# Patient Record
Sex: FEMALE | Race: WHITE
[De-identification: names, ages, dates, MRNs, and addresses within clinical notes are randomized per-mention and may not be internally consistent; named-entity substitution may affect disease eponyms.]

---
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
## 2020-10-23 ENCOUNTER — HOSPITAL ENCOUNTER (OUTPATIENT)
Dept: HOSPITAL 47 - LABWHC1 | Age: 20
Discharge: HOME | End: 2020-10-23
Attending: OBSTETRICS & GYNECOLOGY
Payer: COMMERCIAL

## 2020-10-23 DIAGNOSIS — Z01.818: Primary | ICD-10-CM

## 2020-10-23 LAB
ANION GAP SERPL CALC-SCNC: 5 MMOL/L
BASOPHILS # BLD AUTO: 0 K/UL (ref 0–0.2)
BASOPHILS NFR BLD AUTO: 1 %
BUN SERPL-SCNC: 13 MG/DL (ref 7–17)
CALCIUM SPEC-MCNC: 9.9 MG/DL (ref 8.4–10.2)
CHLORIDE SERPL-SCNC: 104 MMOL/L (ref 98–107)
CO2 SERPL-SCNC: 30 MMOL/L (ref 22–30)
EOSINOPHIL # BLD AUTO: 0 K/UL (ref 0–0.7)
EOSINOPHIL NFR BLD AUTO: 1 %
ERYTHROCYTE [DISTWIDTH] IN BLOOD BY AUTOMATED COUNT: 4.8 M/UL (ref 3.8–5.4)
ERYTHROCYTE [DISTWIDTH] IN BLOOD: 13.1 % (ref 11.5–15.5)
GLUCOSE SERPL-MCNC: 92 MG/DL (ref 74–99)
HCT VFR BLD AUTO: 43.1 % (ref 34–46)
HGB BLD-MCNC: 13.9 GM/DL (ref 11.4–16)
LYMPHOCYTES # SPEC AUTO: 1.5 K/UL (ref 1–4.8)
LYMPHOCYTES NFR SPEC AUTO: 33 %
MCH RBC QN AUTO: 29 PG (ref 25–35)
MCHC RBC AUTO-ENTMCNC: 32.3 G/DL (ref 31–37)
MCV RBC AUTO: 89.8 FL (ref 80–100)
MONOCYTES # BLD AUTO: 0.3 K/UL (ref 0–1)
MONOCYTES NFR BLD AUTO: 6 %
NEUTROPHILS # BLD AUTO: 2.5 K/UL (ref 1.3–7.7)
NEUTROPHILS NFR BLD AUTO: 57 %
PLATELET # BLD AUTO: 227 K/UL (ref 150–450)
POTASSIUM SERPL-SCNC: 4.6 MMOL/L (ref 3.5–5.1)
SODIUM SERPL-SCNC: 139 MMOL/L (ref 137–145)
WBC # BLD AUTO: 4.4 K/UL (ref 3.8–10.6)

## 2020-10-23 PROCEDURE — 36415 COLL VENOUS BLD VENIPUNCTURE: CPT

## 2020-10-23 PROCEDURE — 85025 COMPLETE CBC W/AUTO DIFF WBC: CPT

## 2020-10-23 PROCEDURE — 80048 BASIC METABOLIC PNL TOTAL CA: CPT

## 2020-11-01 NOTE — P.HPOB
History of Present Illness


H&P Date: 20


Chief Complaint: Complex left ovarian cyst





This is a 20 y.o. female,  0, who presents for exploratory laparotomy, 

left ovarian cystectomy, possible left oophorectomy.  She was diagnosed with a 

large left ovarian cyst in March of this year.  She stopped having menses for 

about 3 months in 2019.  She then resumed menses in about February and 

they were occuring every 2-6 weeks.  Currently menses are more regular.  Her 

latest ultrasound showed left ovary with 2 cysts, 5.9 x 4.6 and 7.6 x 5.1 cm 

with an echogenic structure within measuring 1.6 cm.  Her right ovary appeared 

normal.  Uterus and lining were normal.  She has some cramping on the left side 

during her cycle.  CA-125 was 32.3 (normal).





OB Hx:  G0


Gyn Hx:  No history of STDs.  Using condoms for birth control.


Social Hx:  Single.  Full-time collage student.











Review of Systems


Constitutional: Denies chills, Denies fever


Eyes: denies blurred vision, denies pain


Ears, nose, mouth and throat: Denies headache, Denies sore throat


Cardiovascular: Denies chest pain, Denies shortness of breath


Respiratory: Denies cough


Gastrointestinal: Denies abdominal pain, Denies diarrhea, Denies nausea, Denies 

vomiting


Genitourinary: Reports dysmenorrhea (mild)


Menstruation: Reports menses 1-7 days, Reports menses variable


Musculoskeletal: Denies myalgias


Integumentary: Denies pruritus, Denies rash


Neurological: Denies numbness, Denies weakness


Psychiatric: Reports anxiety, Denies depression


Endocrine: Denies fatigue, Denies weight change





Past Medical History


Additional Past Medical History / Comment(s): left ovarian cyst,"white coat 

syndrome-b/p runs normal at home"


History of Any Multi-Drug Resistant Organisms: None Reported


Additional Past Surgical History / Comment(s): wisdom teeth


Past Anesthesia/Blood Transfusion Reactions: No Reported Reaction


Additional Past Anesthesia/Blood Transfusion Reaction / Comment(s): no hx blood 

transfusion


Past Psychological History: Anxiety


Smoking Status: Never smoker


Past Alcohol Use History: None Reported


Past Drug Use History: None Reported





- Past Family History


  ** Mother


Additional Family Medical History / Comment(s): heart murmur





  ** Father


Family Medical History: Hypertension





Medications and Allergies


                                Home Medications











 Medication  Instructions  Recorded  Confirmed  Type


 


No Known Home Medications  10/29/20 10/29/20 History








                                    Allergies











Allergy/AdvReac Type Severity Reaction Status Date / Time


 


No Known Allergies Allergy   Verified 10/29/20 16:14














Exam


Osteopathic Statement: *.  No significant issues noted on an osteopathic 

structural exam other than those noted in the History and Physical/Consult.





Gen:  pleasant well-developed, well-nourished female


HEENT:  within normal limits


Heart:  regular rate in rhythm


Lungs: clear to auscultation bilaterally


Abdomen:  soft, non-tender


Pelvic:  uterus small, anteverted, non-tender.  L. adnexa enlarged about 7-8 cm,

non-tender.  R. adnexa non-tender.


Extremities:  neg. Jak's.





Assessment and Plan


(1) Complex cyst of left ovary


Current Visit: No   Status: Acute   Code(s): N83.292 - OTHER OVARIAN CYST, LEFT 

SIDE   SNOMED Code(s): 11648213372600196


   


Plan: 





Proceed with laparotomy, left ovarian cystectomy, possible left oophorectomy.





I have discussed the risks, benefits, and alternative therapies for the above-

mentioned procedure and for both sedation/anesthesia as well as necessary blood 

products administration, if indicated, as they pertain to this patient.  The 

patient has indicated her understanding and acceptance of the risks and 

procedures discussed.

## 2020-11-02 ENCOUNTER — HOSPITAL ENCOUNTER (INPATIENT)
Dept: HOSPITAL 47 - 2ORMAIN | Age: 20
LOS: 1 days | Discharge: HOME | DRG: 743 | End: 2020-11-03
Attending: OBSTETRICS & GYNECOLOGY | Admitting: OBSTETRICS & GYNECOLOGY
Payer: COMMERCIAL

## 2020-11-02 VITALS — BODY MASS INDEX: 22.1 KG/M2

## 2020-11-02 VITALS — RESPIRATION RATE: 16 BRPM

## 2020-11-02 DIAGNOSIS — Z82.49: ICD-10-CM

## 2020-11-02 DIAGNOSIS — N80.1: Primary | ICD-10-CM

## 2020-11-02 DIAGNOSIS — F41.9: ICD-10-CM

## 2020-11-02 PROCEDURE — 86901 BLOOD TYPING SEROLOGIC RH(D): CPT

## 2020-11-02 PROCEDURE — 85025 COMPLETE CBC W/AUTO DIFF WBC: CPT

## 2020-11-02 PROCEDURE — 88305 TISSUE EXAM BY PATHOLOGIST: CPT

## 2020-11-02 PROCEDURE — 88342 IMHCHEM/IMCYTCHM 1ST ANTB: CPT

## 2020-11-02 PROCEDURE — 86900 BLOOD TYPING SEROLOGIC ABO: CPT

## 2020-11-02 PROCEDURE — 0UB10ZZ EXCISION OF LEFT OVARY, OPEN APPROACH: ICD-10-PCS

## 2020-11-02 PROCEDURE — 81025 URINE PREGNANCY TEST: CPT

## 2020-11-02 PROCEDURE — 86850 RBC ANTIBODY SCREEN: CPT

## 2020-11-02 RX ADMIN — HYDROMORPHONE HYDROCHLORIDE PRN MG: 1 INJECTION, SOLUTION INTRAMUSCULAR; INTRAVENOUS; SUBCUTANEOUS at 09:25

## 2020-11-02 RX ADMIN — POTASSIUM CHLORIDE SCH MLS: 14.9 INJECTION, SOLUTION INTRAVENOUS at 06:40

## 2020-11-02 RX ADMIN — IBUPROFEN PRN MG: 600 TABLET ORAL at 22:25

## 2020-11-02 RX ADMIN — DOCUSATE SODIUM AND SENNOSIDES SCH: 50; 8.6 TABLET ORAL at 14:37

## 2020-11-02 RX ADMIN — HYDROMORPHONE HYDROCHLORIDE PRN MG: 1 INJECTION, SOLUTION INTRAMUSCULAR; INTRAVENOUS; SUBCUTANEOUS at 09:11

## 2020-11-02 RX ADMIN — KETOROLAC TROMETHAMINE PRN MG: 15 INJECTION, SOLUTION INTRAMUSCULAR; INTRAVENOUS at 10:14

## 2020-11-02 RX ADMIN — KETOROLAC TROMETHAMINE PRN MG: 15 INJECTION, SOLUTION INTRAMUSCULAR; INTRAVENOUS at 15:55

## 2020-11-02 RX ADMIN — HYDROCODONE BITARTRATE AND ACETAMINOPHEN PRN EACH: 5; 325 TABLET ORAL at 18:30

## 2020-11-02 NOTE — P.OP
Date of Procedure: 20


Preoperative Diagnosis: 


Large complex left ovarian cyst


Postoperative Diagnosis: 


Left ovarian endometrioma


Procedure(s) Performed: 


Exploratory laparotomy, left ovarian cystectomy


Anesthesia: EL


Surgeon: Chichi Ortega


Assistant #1: Rhonda Harley


Estimated Blood Loss (ml): 200


Pathology: other (Left ovarian cyst)


Condition: stable


Disposition: floor


Indications for Procedure: 


This is a 20 y.o. female,  0, who presents for exploratory laparotomy, 

left ovarian cystectomy, possible left oophorectomy.  She was diagnosed with a 

large left ovarian cyst in March of this year.  She stopped having menses for 

about 3 months in 2019.  She then resumed menses in about February and 

they were occuring every 2-6 weeks.  Currently menses are more regular.  Her 

latest ultrasound showed left ovary with 2 cysts, 5.9 x 4.6 and 7.6 x 5.1 cm 

with an echogenic structure within measuring 1.6 cm.  Her right ovary appeared 

normal.  Uterus and lining were normal.  She has some cramping on the left side 

during her cycle.  CA-125 was 32.3 (normal).


Operative Findings: 


Left ovary is enlarged and did have a large chocolate cyst consistent with a 

left endometrioma.  Right ovary appears normal.  Both tubes appear normal.  No 

other evidence of endometriosis is visualized.


Description of Procedure: 


The patient is taken to the operating room where she is placed in the dorsal 

supine position.  She is prepped and draped in the normal sterile fashion 

including Maguire catheter insertion.  A Pfannenstiel skin incision was made with 

a scalpel.  A second knife was used to carry the incision down to the underlying

layer of fascia.  Fascia was nicked in the midline with a scalpel and then 

extended laterally bilaterally with Robert scissors.  The superior aspect of the 

fascial incision was grasped with Kocher clamps and dissected off the underlying

rectus muscle in the midline with Robert scissors.  The inferior aspect of the 

fascial incision was also grasped with Kocher clamps, elevated off the underl

loretta rectus muscle and then dissected with sharp dissection.  Peritoneum was 

identified and tented up with 2 hemostats.  This was entered sharply with the 

scalpel and then extended superiorly and in fairly with Metzenbaum scissors.  

Next a small Seymour retractor was placed.  A 3 yard moist laparotomy sponge was

used to pack the bowels.  Next the pelvic contents were inspected and the above 

noted findings are made.  The left ovary is brought up to the incision.  Bovie 

cautery was used to gently make a linear incision along the ovary.  The edges of

the incision are held with clamps and then the ovary was dissected away from the

cyst wall with blunt and sharp dissection.  The cyst was inadvertently opened 

and chocolate fluid was extruded.  The remainder the cyst was removed from the 

field after suctioning out the contents.  Next the base of the ovary was then 

cauterized with Bovie cautery for hemostasis.  Also several interrupted stitches

of 0 Vicryl suture are placed for hemostasis.  Next the ovary is sutured back 

together with 3-0 Vicryl suture in a running locked fashion.  Several 

interrupted stitches were also placed for hemostasis.  Copious irrigation was 

carried out with warm saline.  The ovary does appear to be hemostatic.  Next the

ovary is wrapped in a piece of Interceed for prevention of adhesions.  All 

instruments and sponges are removed from the abdomen.  Good hemostasis is noted.

 Next the peritoneal layer is closed with 0 Vicryl suture in a running fashion. 

Muscle layer is reapproximated with 0 Vicryl suture in interrupted fashion.  

Next the fascial layer was closed with 0 PDS suture with 2 sutures meeting in 

the midline and the knots buried on either side and in the midline.  Next the 

subcutaneous tissue was closed with 2 3-0 Vicryl suture in a running fashion.  

Skin was reapproximated with staples.  All sponge and needle counts are correct.

 The patient is then taken to recovery room in stable condition.

## 2020-11-03 VITALS — TEMPERATURE: 98 F | SYSTOLIC BLOOD PRESSURE: 112 MMHG | HEART RATE: 78 BPM | DIASTOLIC BLOOD PRESSURE: 66 MMHG

## 2020-11-03 LAB
BASOPHILS # BLD AUTO: 0 K/UL (ref 0–0.2)
BASOPHILS NFR BLD AUTO: 0 %
EOSINOPHIL # BLD AUTO: 0 K/UL (ref 0–0.7)
EOSINOPHIL NFR BLD AUTO: 0 %
ERYTHROCYTE [DISTWIDTH] IN BLOOD BY AUTOMATED COUNT: 3.74 M/UL (ref 3.8–5.4)
ERYTHROCYTE [DISTWIDTH] IN BLOOD: 13.1 % (ref 11.5–15.5)
HCT VFR BLD AUTO: 33.6 % (ref 34–46)
HGB BLD-MCNC: 10.9 GM/DL (ref 11.4–16)
LYMPHOCYTES # SPEC AUTO: 2.4 K/UL (ref 1–4.8)
LYMPHOCYTES NFR SPEC AUTO: 26 %
MCH RBC QN AUTO: 29.2 PG (ref 25–35)
MCHC RBC AUTO-ENTMCNC: 32.5 G/DL (ref 31–37)
MCV RBC AUTO: 89.9 FL (ref 80–100)
MONOCYTES # BLD AUTO: 0.8 K/UL (ref 0–1)
MONOCYTES NFR BLD AUTO: 8 %
NEUTROPHILS # BLD AUTO: 5.9 K/UL (ref 1.3–7.7)
NEUTROPHILS NFR BLD AUTO: 63 %
PLATELET # BLD AUTO: 156 K/UL (ref 150–450)
WBC # BLD AUTO: 9.3 K/UL (ref 3.8–10.6)

## 2020-11-03 RX ADMIN — HYDROCODONE BITARTRATE AND ACETAMINOPHEN PRN EACH: 5; 325 TABLET ORAL at 11:09

## 2020-11-03 RX ADMIN — IBUPROFEN PRN MG: 600 TABLET ORAL at 05:10

## 2020-11-03 RX ADMIN — DOCUSATE SODIUM AND SENNOSIDES SCH: 50; 8.6 TABLET ORAL at 01:14

## 2020-11-03 RX ADMIN — HYDROCODONE BITARTRATE AND ACETAMINOPHEN PRN EACH: 5; 325 TABLET ORAL at 01:37

## 2020-11-03 RX ADMIN — DOCUSATE SODIUM AND SENNOSIDES SCH: 50; 8.6 TABLET ORAL at 11:10

## 2020-11-03 RX ADMIN — POTASSIUM CHLORIDE SCH: 14.9 INJECTION, SOLUTION INTRAVENOUS at 06:50

## 2020-11-03 NOTE — P.DS
Providers


Date of admission: 


20 06:08





Expected date of discharge: 20


Attending physician: 


Chichi Ortega





Primary care physician: 


Stated None








- Discharge Diagnosis(es)


(1) Complex cyst of left ovary


Current Visit: No   Status: Acute   


Hospital Course: 





This is a 20-year-old female  0 para 0 who underwent a exploratory 

laparotomy with left ovarian cystectomy due to large left endometrioma on 

2020.  Postoperatively she has done well.  She is ambulating.  She is 

urinating without difficulty.  She is passing flatus but no bowel movement yet. 

Her pain has been fairly well-controlled with ibuprofen and Norco.  She is 

tolerating her diet okay.  She denies nausea or vomiting.  Vital signs are 

stable.  Abdomen is soft with positive bowel sounds 4.  Incision is clean dry 

and intact with staples in place.  Extremities show negative Homans.  Impression

is status post exploratory laparotomy, left ovarian cystectomy postoperative day

#1.  Plan is to discharge home today.  Routine postoperative instructions are 

given.  She is advised to follow up in the office in approximately 2 days for a 

postoperative check and staple removal.  She will be given a prescription for 

ibuprofen and Norco.  She was counseled on opioid use and has signed a start 

opioid talking form.  She is advised to call the office if she has any further 

questions or concerns prior to her postoperative appointment.


Procedures: 





Exploratory laparotomy, left ovarian cystectomy on 2020


Patient Condition at Discharge: Stable





Plan - Discharge Summary


Discharge Rx Participant: No


New Discharge Prescriptions: 


New


   Ibuprofen [Motrin] 600 mg PO Q6HR PRN #60 tab


     PRN Reason: Mild Discomfort


   HYDROcodone/APAP 5-325MG [Norco 5-325] 1 each PO Q6HR PRN #28 tab


     PRN Reason: Moderate Pain


Discharge Medication List





HYDROcodone/APAP 5-325MG [Norco 5-325] 1 each PO Q6HR PRN #28 tab 20 [Rx]


Ibuprofen [Motrin] 600 mg PO Q6HR PRN #60 tab 20 [Rx]








Follow up Appointment(s)/Referral(s): 


Chichi Ortega DO [Doctor of Osteopathic Medicine] - 20


Activity/Diet/Wound Care/Special Instructions: 


Activity as tolerated.  Diet as tolerated.  May shower, but no tub baths for 1 

week.  No intercourse for 1 week.


Discharge Disposition: HOME SELF-CARE